# Patient Record
Sex: MALE | Race: WHITE | NOT HISPANIC OR LATINO | Employment: FULL TIME | URBAN - METROPOLITAN AREA
[De-identification: names, ages, dates, MRNs, and addresses within clinical notes are randomized per-mention and may not be internally consistent; named-entity substitution may affect disease eponyms.]

---

## 2020-10-29 ENCOUNTER — HISTORICAL (OUTPATIENT)
Dept: ADMINISTRATIVE | Facility: HOSPITAL | Age: 50
End: 2020-10-29

## 2021-01-07 ENCOUNTER — HISTORICAL (OUTPATIENT)
Dept: ADMINISTRATIVE | Facility: HOSPITAL | Age: 51
End: 2021-01-07

## 2021-02-22 LAB — SARS-COV-2 AG RESP QL IA.RAPID: NEGATIVE

## 2021-02-24 ENCOUNTER — HISTORICAL (OUTPATIENT)
Dept: SURGERY | Facility: HOSPITAL | Age: 51
End: 2021-02-24

## 2021-07-06 LAB
ABS NEUT (OLG): 5.54 X10(3)/MCL (ref 2.1–9.2)
BASOPHILS # BLD AUTO: 0 X10(3)/MCL (ref 0–0.2)
BASOPHILS NFR BLD AUTO: 0 %
BUN SERPL-MCNC: 11.3 MG/DL (ref 8.4–25.7)
CALCIUM SERPL-MCNC: 9.5 MG/DL (ref 8.4–10.2)
CHLORIDE SERPL-SCNC: 103 MMOL/L (ref 98–107)
CO2 SERPL-SCNC: 30 MMOL/L (ref 22–29)
CREAT SERPL-MCNC: 1.17 MG/DL (ref 0.73–1.18)
CREAT/UREA NIT SERPL: 10
EOSINOPHIL # BLD AUTO: 0.1 X10(3)/MCL (ref 0–0.9)
EOSINOPHIL NFR BLD AUTO: 1 %
ERYTHROCYTE [DISTWIDTH] IN BLOOD BY AUTOMATED COUNT: 12.2 % (ref 11.5–17)
GLUCOSE SERPL-MCNC: 95 MG/DL (ref 74–100)
HCT VFR BLD AUTO: 48.1 % (ref 42–52)
HGB BLD-MCNC: 16.3 GM/DL (ref 14–18)
LYMPHOCYTES # BLD AUTO: 1.5 X10(3)/MCL (ref 0.6–4.6)
LYMPHOCYTES NFR BLD AUTO: 20 %
MCH RBC QN AUTO: 31.7 PG (ref 27–31)
MCHC RBC AUTO-ENTMCNC: 33.9 GM/DL (ref 33–36)
MCV RBC AUTO: 93.6 FL (ref 80–94)
MONOCYTES # BLD AUTO: 0.6 X10(3)/MCL (ref 0.1–1.3)
MONOCYTES NFR BLD AUTO: 7 %
NEUTROPHILS # BLD AUTO: 5.54 X10(3)/MCL (ref 2.1–9.2)
NEUTROPHILS NFR BLD AUTO: 71 %
PLATELET # BLD AUTO: 255 X10(3)/MCL (ref 130–400)
PMV BLD AUTO: 10.6 FL (ref 9.4–12.4)
POTASSIUM SERPL-SCNC: 4.2 MMOL/L (ref 3.5–5.1)
RBC # BLD AUTO: 5.14 X10(6)/MCL (ref 4.7–6.1)
SARS-COV-2 AG RESP QL IA.RAPID: NEGATIVE
SODIUM SERPL-SCNC: 141 MMOL/L (ref 136–145)
WBC # SPEC AUTO: 7.8 X10(3)/MCL (ref 4.5–11.5)

## 2021-07-08 ENCOUNTER — HISTORICAL (OUTPATIENT)
Dept: ADMINISTRATIVE | Facility: HOSPITAL | Age: 51
End: 2021-07-08

## 2022-04-11 ENCOUNTER — HISTORICAL (OUTPATIENT)
Dept: ADMINISTRATIVE | Facility: HOSPITAL | Age: 52
End: 2022-04-11

## 2022-04-25 VITALS
BODY MASS INDEX: 31.3 KG/M2 | SYSTOLIC BLOOD PRESSURE: 136 MMHG | DIASTOLIC BLOOD PRESSURE: 94 MMHG | WEIGHT: 236.13 LBS | HEIGHT: 73 IN

## 2022-04-30 NOTE — OP NOTE
Patient:   Lenin Marley            MRN: [id number]            FIN: [id number]               Age:   50 years     Sex:  Male     :  1970   Associated Diagnoses:   None   Author:   Avelino [place] MD      Operative Note   DATE OF OPERATION:  2021    PREOPERATIVE DIAGNOSIS:  1.  Left L4-5 herniated disc with radiculopathy    POSTOPERATIVE DIAGNOSIS:  1.  Left L4-5 herniated disc with radiculopathy    SURGEON:  Avelino [place] M.D.   ASSISTANT: JONG Vides    PROCEDURE:  1.  Left L4-5 microdiscectomy  2.  Microdissection for spinal procedure    ANESTHESIA:  General endotracheal    BLOOD LOSS:  50 cc    SPECIMEN(s):  Disc    COMPLICATIONS:  None    HISTORY:  Patient is a 50-year-old gentleman with several years of low back pain but more recent and intractable left lower extremity pain, numbness and weakness.  Imaging study showed a left L4-5 disc herniation.  Options were discussed and, after conservative management failed, surgery was elected.. The patient understood and accepted the nature of this surgery as well as its attendant risks.    FINDINGS:  As expected, there was a significant amount of degenerated disc material in the subligamentous space impinging the traversing nerve root.  In fact, the intraoperative findings seem more significant than the MRI and that is consistent with the patient's increase in pain over the last several weeks.  This, as well as a significant amount of degenerated disc material was removed from the posterolateral portion of the interspace. There was excellent nerve root decompression at the completion of the procedure. The patient tolerated the procedure well.    PROCEDURE IN DETAIL:  After endotracheal intubation and induction of general anesthesia, the patient was placed in the prone position on the spinal frame with pressure points appropriately padded.  The frame was cranked up and the back was prepped and draped in the usual fashion.  The patient  received intravenous antibiotics prior to the start of the procedure.  The C-arm was used to guide the placement of the initial incision.    A 2 cm incision was made through the skin, subcutaneous tissues and fascia on the appropriate side after infiltrating the skin and muscle with 1/4% Marcaine containing 1/200,000 epinephrine.  Then progressive dilators were inserted down to the lamina and then a 16-18 mm guide tube was put into place and positioned appropriately according to the C-arm.  Then the operating microscope was brought into place.  Muscle was cleared off of the lateral inferior portion of the lamina and then the high speed drill, curette and Kerrison rongeurs were used to create a hemilaminotomy.  The superior articulating facet of the inferior level was then undercut and then the ligamentum flavum was detached laterally and left as a flap to replace back over the laminotomy defect at the completion of the procedure..  The nerve root was retracted medially and then the disc space was entered with a knife.  Straight and up-biting pituitary rongeurs, curettes and hooks were used to empty the posterior lateral portion of the disc space of degenerative disc material. Exploration was carried out superiorly, medially and inferiorly in the epidural space to ensure there were no free fragments. The disc space and wound were irrigated copiously with antibiotic irrigating solution. When appropriate, a drain was placed in the epidural space and brought out through a separate stab incision. Please refer to the Findings Section of this report for specific details regarding this patient    The guide tube was removed slowly in a rotating fashion with bipolar cautery of the soft tissues and then the fascia was closed with 0 Vicryl and the subcutaneous tissue was closed with 2-0 Vicryl in separate layers.  Dermabond skin glue was used for the skin edges and the patient was returned to the supine position.  The patient  was then taken to the post anesthetic care unit in satisfactory condition with correct sponge and needle counts.

## 2022-04-30 NOTE — H&P
Patient:   Lenin Marley            MRN: [id number]            FIN: [id number]               Age:   50 years     Sex:  Male     :  1970   Associated Diagnoses:   None   Author:   Katey LAGUNAS, Brit Marin      Health Status   The H&P was reviewed, the patient was examined, and there are no changes to the patient's condition..

## 2022-05-04 ENCOUNTER — TELEPHONE (OUTPATIENT)
Dept: NEUROSURGERY | Facility: CLINIC | Age: 52
End: 2022-05-04

## 2022-05-04 NOTE — TELEPHONE ENCOUNTER
----- Message from Robyn Aguirre MA sent at 5/3/2022 12:27 PM CDT -----  Regarding: [facility]  Per Kiana,  they are waiting on the PCP to submit the new consult.        ----- Message -----  From: Robyn Aguirre MA  Sent: 4/29/2022  11:37 AM CDT  To: Robyn Aguirre MA  Subject: [facility]                            > From: Robyn Aguirre  > To: Robyn Aguirre  > Sent: 4/19/2022 5:17 PM  >  [facility].  [facility].  Mailed the PT order to the patient.  MRS

## 2022-05-05 NOTE — HISTORICAL [facility] CERNER
This is a historical note converted from Ariana. Formatting and pictures may have been removed.  Please reference Ariana for original formatting and attached multimedia. Procedure Name  Left L2 and L3?Transforaminal Epidural Steroid Injections  ?   Pre-Procedure Diagnoses:  1. Chronic pain syndrome  2. Low back pain  3. Lumbar radiculopathy  4. Lumbar disc displacement  5. Lumbar degenerative disc disease  ?   Post-Procedure Diagnoses:  1. Chronic pain syndrome  2. Low back pain  3. Lumbar radiculopathy  4. Lumbar disc displacement  5. Lumbar degenerative disc disease  ?   Anesthesia:  Local and MAC  ?  Estimated Blood Loss:  None  ?  Complications:  None  ?  Informed Consent:  The procedure, risks, benefits, and alternatives were discussed with the patient.? There were no contraindications to the procedure.? The patient expressed understanding and agreed to proceed.? Fully informed written consent was obtained.?  ?  Description of the Procedure:  The patient was taken to the operating room.? IV access was obtained prior to the start of the procedure.? The patient was positioned prone on the fluoroscopy table.? Continuous hemodynamic monitoring was initiated and continued throughout the duration of the procedure.? The skin overlying the lumbosacral spine was prepped with Chloraprep and draped into a sterile field.? An oblique fluoroscopic view was obtained on the?left side at?L2, with the superior articular process of the inferior vertebral body aligned with the pedicle.? Skin anesthesia was achieved using 1 mL of 1% lidocaine.? A 22-gauge?3.5 inch Quinke spinal needle was slowly inserted and advanced towards the 6 oclock position of the pedicle and into the epidural space.? Proper needle position was confirmed under AP, oblique, and lateral fluoroscopic views.? Negative aspiration for blood or CSF was confirmed.? 0.5 mL of Isovue contrast was injected.? Fluoroscopic imaging revealed a clear outline of the spinal  nerve with proximal spread of agent through the neural foramen and into the epidural space.? Then a combination of 5 mg of dexamethasone and 1 mL of 0.5% bupivacaine was easily injected.? There was no pain on injection.? The needle was removed intact and bleeding was nil.? The same procedure was repeated in identical fashion on the?left side at L3. Sterile bandages were applied.? The patient was taken to the recovery room for further observation in stable condition.? The patient was then discharged home without any complications.

## 2022-05-05 NOTE — HISTORICAL [facility] C[facility]ER
This is a historical note converted from Ariana. Formatting and pictures may have been removed.  Please reference Ariana for original formatting and attached multimedia. Procedure Name  Right L2 and L3?Transforaminal Epidural Steroid Injections  ?   Pre-Procedure Diagnoses:  1. Chronic pain syndrome  2. Low back pain  3. Lumbar radiculopathy  4. Lumbar disc displacement  5. Lumbar degenerative disc disease  ?   Post-Procedure Diagnoses:  1. Chronic pain syndrome  2. Low back pain  3. Lumbar radiculopathy  4. Lumbar disc displacement  5. Lumbar degenerative disc disease  ?   Anesthesia:  Local and MAC  ?  Estimated Blood Loss:  None  ?  Complications:  None  ?  Informed Consent:  The procedure, risks, benefits, and alternatives were discussed with the patient.? There were no contraindications to the procedure.? The patient expressed understanding and agreed to proceed.? Fully informed written consent was obtained.?  ?  Description of the Procedure:  The patient was taken to the operating room.? IV access was obtained prior to the start of the procedure.? The patient was positioned prone on the fluoroscopy table.? Continuous hemodynamic monitoring was initiated and continued throughout the duration of the procedure.? The skin overlying the lumbosacral spine was prepped with Chloraprep and draped into a sterile field.? An oblique fluoroscopic view was obtained on the right side at?L2, with the superior articular process of the inferior vertebral body aligned with the pedicle.? Skin anesthesia was achieved using 1 mL of 1% lidocaine.? A 22-gauge 3.5 inch Quinke spinal needle was slowly inserted and advanced towards the 6 oclock position of the pedicle and into the epidural space.? Proper needle position was confirmed under AP, oblique, and lateral fluoroscopic views.? Negative aspiration for blood or CSF was confirmed.? 0.5 mL of Isovue contrast was injected.? Fluoroscopic imaging revealed a clear outline of the spinal  nerve with proximal spread of agent through the neural foramen and into the epidural space.? Then a combination of 5 mg of dexamethasone and 1 mL of 0.5% bupivacaine was easily injected.? There was no pain on injection.? The needle was removed intact and bleeding was nil.? The same procedure was repeated in identical fashion on the right side at L3. Sterile bandages were applied.? The patient was taken to the recovery room for further observation in stable condition.? The patient was then discharged home without any complications.

## 2022-05-25 RX ORDER — ZOLPIDEM TARTRATE 10 MG/1
5 TABLET ORAL NIGHTLY PRN
COMMUNITY

## 2022-05-25 RX ORDER — IBUPROFEN 200 MG
200 TABLET ORAL
COMMUNITY

## 2022-05-25 RX ORDER — DIVALPROEX SODIUM 125 MG/1
125 CAPSULE, COATED PELLETS ORAL NIGHTLY
COMMUNITY

## 2022-05-25 RX ORDER — PROPRANOLOL HYDROCHLORIDE 20 MG/1
40 TABLET ORAL NIGHTLY
COMMUNITY

## 2022-05-25 RX ORDER — DEXTROAMPHETAMINE SACCHARATE, AMPHETAMINE ASPARTATE MONOHYDRATE, DEXTROAMPHETAMINE SULFATE AND AMPHETAMINE SULFATE 2.5; 2.5; 2.5; 2.5 MG/1; MG/1; MG/1; MG/1
10 CAPSULE, EXTENDED RELEASE ORAL
COMMUNITY

## 2022-05-25 RX ORDER — BUSPIRONE HYDROCHLORIDE 10 MG/1
10 TABLET ORAL NIGHTLY
COMMUNITY

## 2022-05-25 RX ORDER — GABAPENTIN 400 MG/1
800 CAPSULE ORAL 3 TIMES DAILY
COMMUNITY

## 2022-05-25 RX ORDER — SERTRALINE HYDROCHLORIDE 100 MG/1
200 TABLET, FILM COATED ORAL NIGHTLY
COMMUNITY

## 2022-05-25 RX ORDER — ACETAMINOPHEN 325 MG/1
325 TABLET ORAL
COMMUNITY

## 2022-05-26 ENCOUNTER — OFFICE VISIT (OUTPATIENT)
Dept: NEUROSURGERY | Facility: CLINIC | Age: 52
End: 2022-05-26
Payer: OTHER GOVERNMENT

## 2022-05-26 VITALS — HEIGHT: 73 IN | WEIGHT: 239 LBS | BODY MASS INDEX: 31.68 KG/M2

## 2022-05-26 DIAGNOSIS — G56.21 ULNAR NERVE ENTRAPMENT AT ELBOW, RIGHT: ICD-10-CM

## 2022-05-26 DIAGNOSIS — G56.03 BILATERAL CARPAL TUNNEL SYNDROME: ICD-10-CM

## 2022-05-26 DIAGNOSIS — M47.22 CERVICAL SPONDYLOSIS WITH RADICULOPATHY: Primary | ICD-10-CM

## 2022-05-26 PROCEDURE — 99213 PR OFFICE/OUTPT VISIT, EST, LEVL III, 20-29 MIN: ICD-10-PCS | Mod: 95,,, | Performed by: PHYSICIAN ASSISTANT

## 2022-05-26 PROCEDURE — 99213 OFFICE O/P EST LOW 20 MIN: CPT | Mod: 95,,, | Performed by: PHYSICIAN ASSISTANT

## 2022-05-26 NOTE — PROGRESS NOTES
[facility]  History & Physical  Neurosurgery      Lenin De León   [id number]   1970       The patient location is: [place], [place]  The chief complaint leading to consultation is: neck pain, arm pain and numbness.    Visit type: audiovisual.  Multiple attempts were made for video contact.  We were able to connect with visual but not ideal.  The patient was called on the phone.  We then lost visual contact.  We continued the visit by way of telephone.    Face to Face time with patient: 5 minutes.    23 minutes of total time spent on the encounter, which includes face to face time and non-face to face time preparing to see the patient (eg, review of tests), Obtaining and/or reviewing separately obtained history, Documenting clinical information in the electronic or other health record, Independently interpreting results (not separately reported) and communicating results to the patient/family/caregiver, or Care coordination (not separately reported).         Each patient to whom he or she provides medical services by telemedicine is:  (1) informed of the relationship between the physician and patient and the respective role of any other health care provider with respect to management of the patient; and (2) notified that he or she may decline to receive medical services by telemedicine and may withdraw from such care at any time.            HPI:  Lenin De León is a 51 y.o. male who presents for neurosurgical evaluation.  The patient is known to Dr. Fontenot for having undergone left L4-5 microdiskectomy on 07/08/2021.  Although he continues with lower back pain, the patient has seen a resolution of his left lower extremity pain.  He has a long history of neck pain.  This began in 2003 due to performing  duties.  He describes numbness that begins in the he hands and troubles of the arms.  He awakens with numbness and pain.  Using his computer also brings on numbness in his hands.   Subjectively, he does not have weakness in either upper extremity.  He describes neck pain as well as pain at the inside of the elbow and into the palm side of the 2nd and 3rd fingers.  Electrical studies were obtained on 01/24/2022.  The report shows overall minimal change concerning the right median nerve compared to the prior study 2016 to support diagnosis of mild right carpal tunnel syndrome.  Left side is not appreciably changed.  We do not have the prior electrical study from 2016. He has undergone an injection at the right carpal tunnel which did help his symptoms he is unable to tolerate wearing wrist splints at night.  He was last seen via telemedicine on 03/30/2022.  Physical therapy was advised at that time.  [facility]  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  The longer he sits and work sitting at the computer, the pain worsens.  He reports she of constipation.  He does not have disturbances in bladder function.  He does not have difficulties with his balance.  He is to return to see the orthopedic on July 15, 2022. He is seen today in follow-up.    Past Medical History:   Diagnosis Date    Bilateral carpal tunnel syndrome     Cervical spondylosis with radiculopathy     Herniation of lumbosacral intervertebral disc with myelopathy     Impingement of ulnar nerve, left     Impingement of ulnar nerve, right     Migraine headache        Past Surgical History:   Procedure Laterality Date    left L4-5 microdiscectomy  07/08/2021    Dr. [place]    ROTATOR CUFF REPAIR Left     04/2007       Family History   Problem Relation Age of Onset    Lung disease Mother     Osteoporosis Mother     Diabetes Mellitus Mother        Social History     Socioeconomic History    Marital status:    Tobacco Use    Smoking status: Never Smoker    Smokeless tobacco: Never Used       Current Outpatient Medications    Medication Sig Dispense Refill    acetaminophen (TYLENOL) 325 MG tablet Take 325 mg by mouth as needed.      busPIRone (BUSPAR) 10 MG tablet Take 10 mg by mouth nightly.      dextroamphetamine-amphetamine (ADDERALL XR) 10 MG 24 hr capsule Take 10 mg by mouth as needed.      divalproex (DEPAKOTE SPRINKLE) 125 mg capsule Take 125 mg by mouth nightly.      gabapentin (NEURONTIN) 400 MG capsule Take 800 mg by mouth 3 (three) times daily.      ibuprofen (ADVIL,MOTRIN) 200 MG tablet Take 200 mg by mouth as needed.      multivitamin capsule Take 1 capsule by mouth once daily.      propranoloL (INDERAL) 20 MG tablet Take 40 mg by mouth nightly.      sertraline (ZOLOFT) 100 MG tablet Take 200 mg by mouth nightly.      zolpidem (AMBIEN) 10 mg Tab Take 5 mg by mouth nightly as needed.       No current facility-administered medications for this visit.       Review of patient's allergies indicates:  No Known Allergies     ROS:    Review of Systems   Constitutional: Negative for chills and fever.   HENT: Negative for nosebleeds and sore throat.    Eyes: Negative for pain and visual disturbance.   Respiratory: Negative for cough, chest tightness and shortness of breath.    Cardiovascular: Negative for chest pain.   Gastrointestinal: Positive for constipation. Negative for diarrhea, nausea and vomiting.   Genitourinary: Negative for difficulty urinating, dysuria and hematuria.   Musculoskeletal: Positive for neck pain. Negative for gait problem and myalgias.   Skin: Negative for rash.   Neurological: Positive for numbness. Negative for dizziness, facial asymmetry and headaches.   Psychiatric/Behavioral: Negative for confusion and sleep disturbance. The patient is not nervous/anxious.        PE:    General:  Pleasant. Well-nourished. Well-groomed.    Lungs:  Quiet, non-labored breathing.    Neurological:    Oriented to Person, Place, Time       ASSESSMENT/PLAN:     1. Cervical spondylosis with radiculopathy  - X-Ray  Cervical Spine 2 or 3 Views; Future      2. Bilateral carpal tunnel syndrome      3. Ulnar nerve entrapment at elbow, right       Options were discussed at length with the patient.  We discussed his work station.  From what he describes, he has it set up in an erodynamically correct position.  He will undergo a course of physical therapy.  We discussed trying cervical traction at therapy to see if it helps.  He can then obtain a home unit if it is helpful.  He is to see the orthopedic on July 15th.  We will schedule him to return for follow-up 2-3 weeks after that date.  I have asked him to obtain cervical flexion and extension views.  He voiced understanding.

## 2022-06-07 ENCOUNTER — TELEPHONE (OUTPATIENT)
Dept: ADMINISTRATIVE | Facility: HOSPITAL | Age: 52
End: 2022-06-07
Payer: OTHER GOVERNMENT

## 2022-06-27 PROBLEM — G56.03 BILATERAL CARPAL TUNNEL SYNDROME: Status: ACTIVE | Noted: 2022-06-27

## 2022-06-27 PROBLEM — G56.21 ULNAR NERVE ENTRAPMENT AT ELBOW, RIGHT: Status: ACTIVE | Noted: 2022-06-27

## 2022-06-27 PROBLEM — M47.22 CERVICAL SPONDYLOSIS WITH RADICULOPATHY: Status: ACTIVE | Noted: 2022-06-27
